# Patient Record
Sex: MALE | HISPANIC OR LATINO
[De-identification: names, ages, dates, MRNs, and addresses within clinical notes are randomized per-mention and may not be internally consistent; named-entity substitution may affect disease eponyms.]

---

## 2018-10-22 PROBLEM — Z00.00 ENCOUNTER FOR PREVENTIVE HEALTH EXAMINATION: Status: ACTIVE | Noted: 2018-10-22

## 2018-11-02 ENCOUNTER — APPOINTMENT (OUTPATIENT)
Dept: VASCULAR SURGERY | Facility: CLINIC | Age: 78
End: 2018-11-02
Payer: MEDICARE

## 2018-11-02 VITALS
SYSTOLIC BLOOD PRESSURE: 139 MMHG | DIASTOLIC BLOOD PRESSURE: 73 MMHG | HEART RATE: 73 BPM | WEIGHT: 156 LBS | HEIGHT: 66 IN | BODY MASS INDEX: 25.07 KG/M2

## 2018-11-02 DIAGNOSIS — Z82.49 FAMILY HISTORY OF ISCHEMIC HEART DISEASE AND OTHER DISEASES OF THE CIRCULATORY SYSTEM: ICD-10-CM

## 2018-11-02 DIAGNOSIS — Z83.3 FAMILY HISTORY OF DIABETES MELLITUS: ICD-10-CM

## 2018-11-02 DIAGNOSIS — Z86.79 PERSONAL HISTORY OF OTHER DISEASES OF THE CIRCULATORY SYSTEM: ICD-10-CM

## 2018-11-02 DIAGNOSIS — I51.9 HEART DISEASE, UNSPECIFIED: ICD-10-CM

## 2018-11-02 DIAGNOSIS — Z86.73 PERSONAL HISTORY OF TRANSIENT ISCHEMIC ATTACK (TIA), AND CEREBRAL INFARCTION W/OUT RESIDUAL DEFICITS: ICD-10-CM

## 2018-11-02 DIAGNOSIS — Z86.39 PERSONAL HISTORY OF OTHER ENDOCRINE, NUTRITIONAL AND METABOLIC DISEASE: ICD-10-CM

## 2018-11-02 PROCEDURE — 93924 LWR XTR VASC STDY BILAT: CPT

## 2018-11-02 PROCEDURE — 93880 EXTRACRANIAL BILAT STUDY: CPT

## 2018-11-02 PROCEDURE — 99204 OFFICE O/P NEW MOD 45 MIN: CPT

## 2018-11-02 RX ORDER — MULTIVITAMIN
TABLET ORAL
Refills: 0 | Status: ACTIVE | COMMUNITY

## 2018-11-02 RX ORDER — INSULIN GLARGINE 100 [IU]/ML
100 INJECTION, SOLUTION SUBCUTANEOUS
Refills: 0 | Status: ACTIVE | COMMUNITY

## 2018-11-02 RX ORDER — PNV NO.95/FERROUS FUM/FOLIC AC 28MG-0.8MG
TABLET ORAL
Refills: 0 | Status: ACTIVE | COMMUNITY

## 2018-11-02 RX ORDER — ATORVASTATIN CALCIUM 20 MG/1
20 TABLET, FILM COATED ORAL
Refills: 0 | Status: ACTIVE | COMMUNITY

## 2018-11-02 RX ORDER — METOPROLOL TARTRATE 100 MG/1
100 TABLET, FILM COATED ORAL
Refills: 0 | Status: ACTIVE | COMMUNITY

## 2018-11-02 RX ORDER — CHLORTHALIDONE 25 MG/1
25 TABLET ORAL
Refills: 0 | Status: ACTIVE | COMMUNITY

## 2018-11-02 RX ORDER — CALCITRIOL 0.25 UG/1
0.25 CAPSULE, LIQUID FILLED ORAL
Refills: 0 | Status: ACTIVE | COMMUNITY

## 2018-11-02 RX ORDER — AMLODIPINE BESYLATE 10 MG/1
10 TABLET ORAL
Refills: 0 | Status: ACTIVE | COMMUNITY

## 2018-11-02 RX ORDER — OLMESARTAN MEDOXOMIL 20 MG/1
20 TABLET, FILM COATED ORAL
Refills: 0 | Status: ACTIVE | COMMUNITY

## 2018-11-02 RX ORDER — HYDRALAZINE HYDROCHLORIDE 50 MG/1
50 TABLET ORAL
Refills: 0 | Status: ACTIVE | COMMUNITY

## 2019-01-11 ENCOUNTER — APPOINTMENT (OUTPATIENT)
Dept: VASCULAR SURGERY | Facility: CLINIC | Age: 79
End: 2019-01-11

## 2019-03-01 ENCOUNTER — APPOINTMENT (OUTPATIENT)
Dept: VASCULAR SURGERY | Facility: CLINIC | Age: 79
End: 2019-03-01
Payer: COMMERCIAL

## 2019-03-01 PROCEDURE — 93926 LOWER EXTREMITY STUDY: CPT

## 2019-03-01 PROCEDURE — 99214 OFFICE O/P EST MOD 30 MIN: CPT

## 2019-03-01 NOTE — PHYSICAL EXAM
[JVD] : no jugular venous distention  [Normal Breath Sounds] : Normal breath sounds [Normal Heart Sounds] : normal heart sounds [2+] : right 2+ [1+] : left 1+ [Ankle Swelling (On Exam)] : not present [Varicose Veins Of Lower Extremities] : not present [] : not present [Abdomen Masses] : No abdominal masses [Skin Ulcer] : no ulcer [Oriented to Place] : oriented to place

## 2019-03-01 NOTE — HISTORY OF PRESENT ILLNESS
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 Patient is not a smoker but patient's wife was a heavy smoker. Patient reports weakness in the left lower extremity which has been getting worse over the past 2 years. History is somewhat unclear because the patient has had significant weakness since that CVA about 8 years ago and is unable to tell us the exact nature of weakness and pain. Patient denies any further CVA or TIA symptoms since the CVA 2 years ago. Patient denies any chest pain or shortness of breath.

## 2019-03-01 NOTE — ASSESSMENT
[FreeTextEntry1] : Patient with peripheral vascular disease with symptoms of weakness.\par Patient may benefit from an angiogram and possible intervention. Awaiting evaluation by renal as far as renal function.\par \par Patient with history of CVA. Patient has calcification of the carotid bifurcation with no significant stenosis noted.

## 2019-03-08 ENCOUNTER — APPOINTMENT (OUTPATIENT)
Dept: VASCULAR SURGERY | Facility: CLINIC | Age: 79
End: 2019-03-08
Payer: MEDICARE

## 2019-03-08 VITALS
DIASTOLIC BLOOD PRESSURE: 74 MMHG | HEART RATE: 74 BPM | HEIGHT: 66 IN | WEIGHT: 156 LBS | SYSTOLIC BLOOD PRESSURE: 158 MMHG | BODY MASS INDEX: 25.07 KG/M2

## 2019-03-08 DIAGNOSIS — I73.9 PERIPHERAL VASCULAR DISEASE, UNSPECIFIED: ICD-10-CM

## 2019-03-08 DIAGNOSIS — Z09 ENCOUNTER FOR FOLLOW-UP EXAMINATION AFTER COMPLETED TREATMENT FOR CONDITIONS OTHER THAN MALIGNANT NEOPLASM: ICD-10-CM

## 2019-03-08 PROCEDURE — 99214 OFFICE O/P EST MOD 30 MIN: CPT

## 2019-03-08 RX ORDER — FLASH GLUCOSE SENSOR
KIT MISCELLANEOUS
Qty: 1 | Refills: 0 | Status: ACTIVE | COMMUNITY
Start: 2018-10-10

## 2019-03-08 RX ORDER — SYRINGE-NEEDLE,INSULIN,0.5 ML 30 GX5/16"
31G X 8 MM SYRINGE, EMPTY DISPOSABLE MISCELLANEOUS
Qty: 100 | Refills: 0 | Status: ACTIVE | COMMUNITY
Start: 2018-09-24

## 2019-03-08 RX ORDER — METOPROLOL SUCCINATE 100 MG/1
100 TABLET, EXTENDED RELEASE ORAL
Qty: 90 | Refills: 0 | Status: ACTIVE | COMMUNITY
Start: 2018-09-18

## 2019-03-08 RX ORDER — INSULIN GLARGINE 100 [IU]/ML
100 INJECTION, SOLUTION SUBCUTANEOUS
Qty: 15 | Refills: 0 | Status: ACTIVE | COMMUNITY
Start: 2018-12-01

## 2019-03-08 RX ORDER — BUMETANIDE 0.5 MG/1
0.5 TABLET ORAL
Qty: 30 | Refills: 0 | Status: ACTIVE | COMMUNITY
Start: 2018-12-15

## 2019-03-08 NOTE — ASSESSMENT
[FreeTextEntry1] : Patient with peripheral vascular disease with symptoms of weakness.\par Patient may benefit from an angiogram and possible intervention. \par Will schedule the patient for angiogram and possible intervention

## 2019-03-09 LAB
ALBUMIN SERPL ELPH-MCNC: 4.5 G/DL
ALP BLD-CCNC: 78 U/L
ALT SERPL-CCNC: 17 U/L
ANION GAP SERPL CALC-SCNC: 12 MMOL/L
APTT BLD: 32.3 SEC
AST SERPL-CCNC: 15 U/L
BASOPHILS # BLD AUTO: 0.08 K/UL
BASOPHILS NFR BLD AUTO: 0.8 %
BILIRUB SERPL-MCNC: 0.2 MG/DL
BUN SERPL-MCNC: 45 MG/DL
CALCIUM SERPL-MCNC: 9.9 MG/DL
CHLORIDE SERPL-SCNC: 103 MMOL/L
CO2 SERPL-SCNC: 24 MMOL/L
CREAT SERPL-MCNC: 2.32 MG/DL
EOSINOPHIL # BLD AUTO: 0.47 K/UL
EOSINOPHIL NFR BLD AUTO: 5 %
GLUCOSE SERPL-MCNC: 203 MG/DL
HCT VFR BLD CALC: 34.9 %
HGB BLD-MCNC: 11.5 G/DL
IMM GRANULOCYTES NFR BLD AUTO: 0.2 %
INR PPP: 0.95 RATIO
LYMPHOCYTES # BLD AUTO: 1.12 K/UL
LYMPHOCYTES NFR BLD AUTO: 11.8 %
MAN DIFF?: NORMAL
MCHC RBC-ENTMCNC: 32.1 PG
MCHC RBC-ENTMCNC: 33 GM/DL
MCV RBC AUTO: 97.5 FL
MONOCYTES # BLD AUTO: 0.72 K/UL
MONOCYTES NFR BLD AUTO: 7.6 %
NEUTROPHILS # BLD AUTO: 7.05 K/UL
NEUTROPHILS NFR BLD AUTO: 74.6 %
PLATELET # BLD AUTO: 252 K/UL
POTASSIUM SERPL-SCNC: 5.4 MMOL/L
PROT SERPL-MCNC: 7.4 G/DL
PT BLD: 10.9 SEC
RBC # BLD: 3.58 M/UL
RBC # FLD: 12.1 %
SODIUM SERPL-SCNC: 139 MMOL/L
WBC # FLD AUTO: 9.46 K/UL

## 2019-03-19 ENCOUNTER — FORM ENCOUNTER (OUTPATIENT)
Age: 79
End: 2019-03-19

## 2019-03-20 ENCOUNTER — APPOINTMENT (OUTPATIENT)
Dept: ENDOVASCULAR SURGERY | Facility: CLINIC | Age: 79
End: 2019-03-20
Payer: MEDICARE

## 2019-03-20 ENCOUNTER — LABORATORY RESULT (OUTPATIENT)
Age: 79
End: 2019-03-20

## 2019-03-20 PROCEDURE — 75625 CONTRAST EXAM ABDOMINL AORTA: CPT

## 2019-03-20 PROCEDURE — 37227Z: CUSTOM

## 2019-03-20 PROCEDURE — 37229Z: CUSTOM | Mod: 59

## 2019-04-12 ENCOUNTER — APPOINTMENT (OUTPATIENT)
Dept: VASCULAR SURGERY | Facility: CLINIC | Age: 79
End: 2019-04-12
Payer: MEDICARE

## 2019-04-12 PROCEDURE — 99214 OFFICE O/P EST MOD 30 MIN: CPT

## 2019-04-12 PROCEDURE — 93925 LOWER EXTREMITY STUDY: CPT

## 2019-04-12 NOTE — ASSESSMENT
[FreeTextEntry1] : Peripheral vascular disease, status post left leg intervention. Patient doing well. Continue antiplatelet therapy. Followup in 3 months.

## 2019-04-12 NOTE — PHYSICAL EXAM
[JVD] : no jugular venous distention  [Normal Breath Sounds] : Normal breath sounds [Normal Heart Sounds] : normal heart sounds [2+] : left 2+ [1+] : left 1+ [Ankle Swelling (On Exam)] : not present [Varicose Veins Of Lower Extremities] : not present [] : not present [Abdomen Masses] : No abdominal masses [Oriented to Place] : oriented to place [Skin Ulcer] : no ulcer

## 2019-07-26 ENCOUNTER — APPOINTMENT (OUTPATIENT)
Dept: VASCULAR SURGERY | Facility: CLINIC | Age: 79
End: 2019-07-26
Payer: MEDICARE

## 2019-08-16 ENCOUNTER — APPOINTMENT (OUTPATIENT)
Dept: VASCULAR SURGERY | Facility: CLINIC | Age: 79
End: 2019-08-16
Payer: MEDICARE

## 2019-08-16 PROCEDURE — 99213 OFFICE O/P EST LOW 20 MIN: CPT

## 2019-08-16 PROCEDURE — 93926 LOWER EXTREMITY STUDY: CPT

## 2019-09-02 PROBLEM — Z09 FOLLOW UP: Status: ACTIVE | Noted: 2019-03-08

## 2019-11-15 ENCOUNTER — APPOINTMENT (OUTPATIENT)
Dept: VASCULAR SURGERY | Facility: CLINIC | Age: 79
End: 2019-11-15
Payer: MEDICARE

## 2019-11-15 VITALS
SYSTOLIC BLOOD PRESSURE: 132 MMHG | DIASTOLIC BLOOD PRESSURE: 75 MMHG | BODY MASS INDEX: 25.07 KG/M2 | WEIGHT: 156 LBS | HEIGHT: 66 IN | HEART RATE: 73 BPM

## 2019-11-15 PROCEDURE — 99213 OFFICE O/P EST LOW 20 MIN: CPT

## 2019-11-15 PROCEDURE — 93926 LOWER EXTREMITY STUDY: CPT

## 2019-11-15 NOTE — HISTORY OF PRESENT ILLNESS
[FreeTextEntry1] : Patient with severe peripheral vascular disease, status post left leg revascularization. Patient doing well. Symptoms of left lower extremity pain and discomfort have improved significantly.

## 2020-02-28 ENCOUNTER — APPOINTMENT (OUTPATIENT)
Dept: VASCULAR SURGERY | Facility: CLINIC | Age: 80
End: 2020-02-28
Payer: MEDICARE

## 2020-02-28 PROCEDURE — 99212 OFFICE O/P EST SF 10 MIN: CPT

## 2020-02-28 PROCEDURE — 93926 LOWER EXTREMITY STUDY: CPT

## 2020-02-28 NOTE — PHYSICAL EXAM
[JVD] : no jugular venous distention  [Normal Breath Sounds] : Normal breath sounds [Normal Heart Sounds] : normal heart sounds [2+] : right 2+ [1+] : left 1+ [Ankle Swelling (On Exam)] : not present [Varicose Veins Of Lower Extremities] : not present [] : not present [Oriented to Place] : oriented to place [Abdomen Masses] : No abdominal masses [Skin Ulcer] : no ulcer

## 2020-06-05 ENCOUNTER — APPOINTMENT (OUTPATIENT)
Dept: VASCULAR SURGERY | Facility: CLINIC | Age: 80
End: 2020-06-05
Payer: MEDICARE

## 2020-06-05 VITALS — BODY MASS INDEX: 25.07 KG/M2 | WEIGHT: 156 LBS | TEMPERATURE: 97.5 F | HEIGHT: 66 IN

## 2020-06-05 PROCEDURE — 99212 OFFICE O/P EST SF 10 MIN: CPT

## 2020-06-05 PROCEDURE — 93926 LOWER EXTREMITY STUDY: CPT

## 2020-06-05 NOTE — PHYSICAL EXAM
[JVD] : no jugular venous distention  [Normal Heart Sounds] : normal heart sounds [Normal Breath Sounds] : Normal breath sounds [2+] : left 2+ [1+] : left 1+ [Varicose Veins Of Lower Extremities] : not present [Ankle Swelling (On Exam)] : not present [] : not present [Skin Ulcer] : no ulcer [Abdomen Masses] : No abdominal masses [Oriented to Place] : oriented to place

## 2020-09-04 ENCOUNTER — APPOINTMENT (OUTPATIENT)
Dept: VASCULAR SURGERY | Facility: CLINIC | Age: 80
End: 2020-09-04
Payer: MEDICARE

## 2020-09-04 VITALS — TEMPERATURE: 97.7 F

## 2020-09-04 PROCEDURE — 93923 UPR/LXTR ART STDY 3+ LVLS: CPT

## 2020-09-04 PROCEDURE — 93926 LOWER EXTREMITY STUDY: CPT

## 2020-09-04 PROCEDURE — 99212 OFFICE O/P EST SF 10 MIN: CPT

## 2020-09-04 NOTE — PHYSICAL EXAM
[JVD] : no jugular venous distention  [Normal Breath Sounds] : Normal breath sounds [Normal Heart Sounds] : normal heart sounds [1+] : left 1+ [2+] : right 2+ [] : not present [Abdomen Masses] : No abdominal masses [Ankle Swelling (On Exam)] : not present [Varicose Veins Of Lower Extremities] : not present [Skin Ulcer] : no ulcer [Oriented to Place] : oriented to place

## 2020-12-11 ENCOUNTER — APPOINTMENT (OUTPATIENT)
Dept: VASCULAR SURGERY | Facility: CLINIC | Age: 80
End: 2020-12-11
Payer: MEDICARE

## 2020-12-11 PROCEDURE — 99213 OFFICE O/P EST LOW 20 MIN: CPT

## 2020-12-11 PROCEDURE — 93926 LOWER EXTREMITY STUDY: CPT

## 2020-12-11 NOTE — ASSESSMENT
[FreeTextEntry1] : 81 yo male with history of cva, hld, htn, dm, pad s/p left lower extremity sfa and pop stent presents for follow up \par \par duplex shows stent with 20-50% stenosis of the proximal aspect of the stent and 50% at the distal aspect of the stent in addition to 50-75% stenosis of the noam\par \par pt to follow up in 3 months with repeat duplex in 3 months \par will also schedule for carotid duplex at that time \par pt to continue with asa plavix and pletal

## 2020-12-11 NOTE — PHYSICAL EXAM
[No Rash or Lesion] : No rash or lesion [Alert] : alert [Calm] : calm [JVD] : no jugular venous distention  [Normal Breath Sounds] : Normal breath sounds [Normal Heart Sounds] : normal heart sounds [2+] : left 2+ [Ankle Swelling (On Exam)] : not present [Varicose Veins Of Lower Extremities] : not present [] : not present [Skin Ulcer] : no ulcer [Oriented to Person] : oriented to person [Oriented to Place] : oriented to place [de-identified] : appears well

## 2020-12-11 NOTE — HISTORY OF PRESENT ILLNESS
[FreeTextEntry1] : 81 yo male with history of cva, hld, htn, dm, pad s/p left lower extremity sfa and pop stent presents for follow up \par pt reports weakness in the left lower extremity since the cva 10 years ago.  pt denies any pain in the left lower extremity.  pt is able to walk about 2-3 blocks without any difficulty.  \par pt denies any recurrent strokes or stroke like symptoms

## 2021-02-22 RX ORDER — CLOPIDOGREL BISULFATE 75 MG/1
75 TABLET, FILM COATED ORAL DAILY
Qty: 90 | Refills: 3 | Status: ACTIVE | COMMUNITY
Start: 2019-03-20 | End: 1900-01-01

## 2021-03-12 ENCOUNTER — APPOINTMENT (OUTPATIENT)
Dept: VASCULAR SURGERY | Facility: CLINIC | Age: 81
End: 2021-03-12

## 2021-04-09 ENCOUNTER — APPOINTMENT (OUTPATIENT)
Dept: VASCULAR SURGERY | Facility: CLINIC | Age: 81
End: 2021-04-09
Payer: MEDICARE

## 2021-04-09 VITALS — TEMPERATURE: 97.1 F

## 2021-04-09 PROCEDURE — 93880 EXTRACRANIAL BILAT STUDY: CPT

## 2021-04-09 PROCEDURE — 93926 LOWER EXTREMITY STUDY: CPT

## 2021-04-09 PROCEDURE — 99213 OFFICE O/P EST LOW 20 MIN: CPT

## 2021-04-09 NOTE — PHYSICAL EXAM
[JVD] : no jugular venous distention  [Normal Breath Sounds] : Normal breath sounds [Normal Heart Sounds] : normal heart sounds [2+] : left 2+ [Ankle Swelling (On Exam)] : not present [Varicose Veins Of Lower Extremities] : not present [] : not present [No Rash or Lesion] : No rash or lesion [Skin Ulcer] : no ulcer [Alert] : alert [Oriented to Person] : oriented to person [Oriented to Place] : oriented to place [Calm] : calm [de-identified] : appears well

## 2021-04-09 NOTE — ASSESSMENT
[FreeTextEntry1] : 79 yo male with history of cva, hld, htn, dm, pad s/p left lower extremity sfa and pop stent presents for follow up \par \par \par pt to follow up in 3 months with repeat duplex in 3 months \par \par pt to continue with asa plavix and pletal

## 2021-04-09 NOTE — HISTORY OF PRESENT ILLNESS
[FreeTextEntry1] : 79 yo male with history of cva, hld, htn, dm, pad s/p left lower extremity sfa and pop stent presents for follow up \par pt reports weakness in the left lower extremity since the cva 10 years ago.  pt denies any pain in the left lower extremity.  pt is able to walk about 2-3 blocks without any difficulty.  \par pt denies any recurrent strokes or stroke like symptoms

## 2021-07-02 ENCOUNTER — APPOINTMENT (OUTPATIENT)
Dept: VASCULAR SURGERY | Facility: CLINIC | Age: 81
End: 2021-07-02
Payer: MEDICARE

## 2021-07-02 VITALS — TEMPERATURE: 97 F

## 2021-07-02 VITALS — HEART RATE: 96 BPM | DIASTOLIC BLOOD PRESSURE: 91 MMHG | SYSTOLIC BLOOD PRESSURE: 153 MMHG

## 2021-07-02 PROCEDURE — 93926 LOWER EXTREMITY STUDY: CPT

## 2021-07-02 PROCEDURE — 99213 OFFICE O/P EST LOW 20 MIN: CPT

## 2021-07-02 NOTE — ASSESSMENT
[FreeTextEntry1] : 79 yo male with history of cva, hld, htn, dm, pad s/p left lower extremity sfa and pop stent presents for follow up \par \par \par pt to follow up in 3 months with repeat duplex in 4 months \par \par pt to continue with asa plavix and pletal

## 2022-03-18 ENCOUNTER — APPOINTMENT (OUTPATIENT)
Dept: VASCULAR SURGERY | Facility: CLINIC | Age: 82
End: 2022-03-18
Payer: MEDICARE

## 2022-03-18 PROCEDURE — 99214 OFFICE O/P EST MOD 30 MIN: CPT

## 2022-03-18 PROCEDURE — 93926 LOWER EXTREMITY STUDY: CPT

## 2022-03-18 RX ORDER — CILOSTAZOL 50 MG/1
50 TABLET ORAL
Qty: 360 | Refills: 3 | Status: ACTIVE | COMMUNITY
Start: 2018-11-02 | End: 1900-01-01

## 2022-03-18 NOTE — PHYSICAL EXAM
[JVD] : no jugular venous distention  [Normal Breath Sounds] : Normal breath sounds [Normal Heart Sounds] : normal heart sounds [2+] : left 2+ [Ankle Swelling (On Exam)] : not present [Varicose Veins Of Lower Extremities] : not present [] : not present [No Rash or Lesion] : No rash or lesion [Skin Ulcer] : no ulcer [Alert] : alert [Oriented to Person] : oriented to person [Oriented to Place] : oriented to place [Calm] : calm [de-identified] : appears well

## 2022-06-24 ENCOUNTER — APPOINTMENT (OUTPATIENT)
Dept: VASCULAR SURGERY | Facility: CLINIC | Age: 82
End: 2022-06-24